# Patient Record
Sex: MALE | URBAN - METROPOLITAN AREA
[De-identification: names, ages, dates, MRNs, and addresses within clinical notes are randomized per-mention and may not be internally consistent; named-entity substitution may affect disease eponyms.]

---

## 2024-01-15 ENCOUNTER — NURSING HOME VISIT (OUTPATIENT)
Dept: POST ACUTE CARE | Facility: EXTERNAL LOCATION | Age: 54
End: 2024-01-15

## 2024-01-15 DIAGNOSIS — K21.9 GASTROESOPHAGEAL REFLUX DISEASE, UNSPECIFIED WHETHER ESOPHAGITIS PRESENT: ICD-10-CM

## 2024-01-15 DIAGNOSIS — K76.82 HEPATIC ENCEPHALOPATHY (MULTI): ICD-10-CM

## 2024-01-15 DIAGNOSIS — R41.840 ATTENTION DEFICIT: Primary | ICD-10-CM

## 2024-01-15 DIAGNOSIS — R53.1 WEAKNESS: Primary | ICD-10-CM

## 2024-01-15 PROCEDURE — 99308 SBSQ NF CARE LOW MDM 20: CPT | Performed by: NURSE PRACTITIONER

## 2024-01-15 RX ORDER — DEXTROAMPHETAMINE SACCHARATE, AMPHETAMINE ASPARTATE, DEXTROAMPHETAMINE SULFATE AND AMPHETAMINE SULFATE 2.5; 2.5; 2.5; 2.5 MG/1; MG/1; MG/1; MG/1
20 TABLET ORAL 2 TIMES DAILY
Qty: 120 TABLET | Refills: 0 | Status: SHIPPED | OUTPATIENT
Start: 2024-01-15 | End: 2024-01-16 | Stop reason: SDUPTHER

## 2024-01-15 NOTE — LETTER
Patient: Raffi Saravia  : 1970    Encounter Date: 01/15/2024    PROGRESS NOTE    Subjective  Chief complaint: Raffi Saravia is a 53 y.o. male who is an acute skilled patient being seen and evaluated for weakness    HPI:  HPI  This patient was admitted to SNF for therapy due to generalized weakness and for medical management after recent hospitalization for hepatic encephalopathy.  Therapy to evaluate and treat. patient seen and examined at bedside in no apparent distress.  Nursing staff voices no new concerns today.  Patient denies chest pain or shortness of breath, nausea or vomiting.  Patient denies nausea vomiting fever chills.     Objective  Vital signs: 118/63, 98.2, 82, 18, 99%    Physical Exam  Constitutional:       General: He is not in acute distress.  Eyes:      Extraocular Movements: Extraocular movements intact.   Cardiovascular:      Rate and Rhythm: Normal rate and regular rhythm.   Pulmonary:      Effort: Pulmonary effort is normal.      Breath sounds: Normal breath sounds.   Abdominal:      General: Bowel sounds are normal.      Palpations: Abdomen is soft.   Musculoskeletal:      Cervical back: Neck supple.      Right lower leg: Edema present.      Left lower leg: Edema present.   Neurological:      Mental Status: He is alert.      Motor: Weakness present.   Psychiatric:         Mood and Affect: Mood normal.         Behavior: Behavior is cooperative.         Assessment/Plan  Problem List Items Addressed This Visit       GERD (gastroesophageal reflux disease)     PPI  Monitor GI symptoms         Hepatic encephalopathy (CMS/HCC)     S/P plug assisted retrograde transvenous obliteration for shunt induced encephalopathy  Liver transplant workup opened, to be completed outpatient  Xifaxan         Weakness - Primary     Therapy to evaluate and treat          Medications, treatments, and labs reviewed  Continue medications and treatments as listed in EMR      Scribe Attestation  Natacha MCCARTHY  Shirley Dupree   attest that this documentation has been prepared under the direction and in the presence of GM Thomas    Provider Attestation - Scribe documentation  All medical record entries made by the Scribe were at my direction and personally dictated by me. I have reviewed the chart and agree that the record accurately reflects my personal performance of the history, physical exam, discussion and plan.   GM Thomas        Electronically Signed By: GM Thomas   1/19/24  5:48 PM

## 2024-01-16 ENCOUNTER — NURSING HOME VISIT (OUTPATIENT)
Dept: POST ACUTE CARE | Facility: EXTERNAL LOCATION | Age: 54
End: 2024-01-16

## 2024-01-16 DIAGNOSIS — R41.840 ATTENTION DEFICIT: ICD-10-CM

## 2024-01-16 DIAGNOSIS — K55.069 SUPERIOR MESENTERIC VEIN THROMBOSIS (MULTI): ICD-10-CM

## 2024-01-16 DIAGNOSIS — K74.60 HEPATIC CIRRHOSIS, UNSPECIFIED HEPATIC CIRRHOSIS TYPE, UNSPECIFIED WHETHER ASCITES PRESENT (MULTI): ICD-10-CM

## 2024-01-16 DIAGNOSIS — K76.82 HEPATIC ENCEPHALOPATHY (MULTI): Primary | ICD-10-CM

## 2024-01-16 DIAGNOSIS — G47.419 NARCOLEPSY WITHOUT CATAPLEXY (HHS-HCC): ICD-10-CM

## 2024-01-16 DIAGNOSIS — K21.9 GASTROESOPHAGEAL REFLUX DISEASE, UNSPECIFIED WHETHER ESOPHAGITIS PRESENT: ICD-10-CM

## 2024-01-16 PROBLEM — K76.9 LIVER DISORDER: Status: ACTIVE | Noted: 2024-01-16

## 2024-01-16 PROBLEM — R53.1 WEAKNESS: Status: ACTIVE | Noted: 2024-01-16

## 2024-01-16 PROCEDURE — 99305 1ST NF CARE MODERATE MDM 35: CPT | Performed by: INTERNAL MEDICINE

## 2024-01-16 RX ORDER — DEXTROAMPHETAMINE SACCHARATE, AMPHETAMINE ASPARTATE, DEXTROAMPHETAMINE SULFATE AND AMPHETAMINE SULFATE 2.5; 2.5; 2.5; 2.5 MG/1; MG/1; MG/1; MG/1
20 TABLET ORAL 2 TIMES DAILY
Qty: 120 TABLET | Refills: 0 | Status: SHIPPED | OUTPATIENT
Start: 2024-01-16

## 2024-01-16 NOTE — PROGRESS NOTES
PROGRESS NOTE    Subjective   Chief complaint: Raffi Saravia is a 53 y.o. male who is an acute skilled patient being seen and evaluated for weakness    HPI:  HPI  This patient was admitted to SNF for therapy due to generalized weakness and for medical management after recent hospitalization for hepatic encephalopathy.  Therapy to evaluate and treat. patient seen and examined at bedside in no apparent distress.  Nursing staff voices no new concerns today.  Patient denies chest pain or shortness of breath, nausea or vomiting.  Patient denies nausea vomiting fever chills.     Objective   Vital signs: 118/63, 98.2, 82, 18, 99%    Physical Exam  Constitutional:       General: He is not in acute distress.  Eyes:      Extraocular Movements: Extraocular movements intact.   Cardiovascular:      Rate and Rhythm: Normal rate and regular rhythm.   Pulmonary:      Effort: Pulmonary effort is normal.      Breath sounds: Normal breath sounds.   Abdominal:      General: Bowel sounds are normal.      Palpations: Abdomen is soft.   Musculoskeletal:      Cervical back: Neck supple.      Right lower leg: Edema present.      Left lower leg: Edema present.   Neurological:      Mental Status: He is alert.      Motor: Weakness present.   Psychiatric:         Mood and Affect: Mood normal.         Behavior: Behavior is cooperative.         Assessment/Plan   Problem List Items Addressed This Visit       GERD (gastroesophageal reflux disease)     PPI  Monitor GI symptoms         Hepatic encephalopathy (CMS/HCC)     S/P plug assisted retrograde transvenous obliteration for shunt induced encephalopathy  Liver transplant workup opened, to be completed outpatient  Xifaxan         Weakness - Primary     Therapy to evaluate and treat          Medications, treatments, and labs reviewed  Continue medications and treatments as listed in EMR      Scribe Attestation  I, Natacha Dupree, Shirley   attest that this documentation has been prepared under  the direction and in the presence of GM Thomas    Provider Attestation - Scribe documentation  All medical record entries made by the Scribe were at my direction and personally dictated by me. I have reviewed the chart and agree that the record accurately reflects my personal performance of the history, physical exam, discussion and plan.   GM Thomas

## 2024-01-16 NOTE — ASSESSMENT & PLAN NOTE
S/P plug assisted retrograde transvenous obliteration for shunt induced encephalopathy  Liver transplant workup opened, to be completed outpatient  Xifaxan

## 2024-01-16 NOTE — LETTER
Patient: Raffi Saravia  : 1970    Encounter Date: 2024    HISTORY & PHYSICAL    Subjective  Chief complaint: Raffi Saravia is a 53 y.o. male who is being seen and evaluated for multiple medical problems.  Patient admitted to SNF for therapy due to weakness after recent hospitalization.    HPI:  HPI  Patient admitted to the hospital after being found lethargic on the floor at home.  Infectious workup was negative for any infection.  Patient continued with confusion and provided medication to help with this.  IR consulted on patient for closure of enlarged splenorenal shunt that was felt to be contributing to worsening confusion.  Patient underwent plug assisted retrograde transvenous obliteration for shunt induced encephalopathy.  Patient did continue on medication for confusion.  Patient was seen by neurology and felt ongoing confusion was related to delirium and hepatic encephalopathy.  Imaging of the brain was negative with no acute changes.  Due to ongoing confusion, liver transplant evaluation was opened and evaluation was conducted and patient with plan to follow-up as an outpatient to complete workup.  CT of abdominal pelvis completed and found to have nonocclusive chronic SMV thrombus with the plan to monitor this with an ultrasound in 3 to 4 weeks.    Past Medical History:   Diagnosis Date   • Cirrhosis of liver (CMS/HCC)    • GERD (gastroesophageal reflux disease)    • Hepatic encephalopathy (CMS/HCC)    • Narcolepsy without cataplexy    • Weakness        No past surgical history on file.    Family History   Problem Relation Name Age of Onset   • No Known Problems Mother     • No Known Problems Father         Social History     Socioeconomic History   • Marital status: Not on file     Spouse name: Not on file   • Number of children: Not on file   • Years of education: Not on file   • Highest education level: Not on file   Occupational History   • Not on file   Tobacco Use   • Smoking  status: Not on file   • Smokeless tobacco: Not on file   Substance and Sexual Activity   • Alcohol use: Not on file   • Drug use: Not on file   • Sexual activity: Not on file   Other Topics Concern   • Not on file   Social History Narrative   • Not on file     Social Determinants of Health     Financial Resource Strain: Not on file   Food Insecurity: Not on file   Transportation Needs: Not on file   Physical Activity: Not on file   Stress: Not on file   Social Connections: Not on file   Intimate Partner Violence: Not on file   Housing Stability: Not on file       Vital signs: 118/63, 98.2, 82, 18, 99%    Objective  Physical Exam  Constitutional:       General: He is not in acute distress.  Eyes:      Extraocular Movements: Extraocular movements intact.   Cardiovascular:      Rate and Rhythm: Normal rate and regular rhythm.   Pulmonary:      Effort: Pulmonary effort is normal.      Breath sounds: Normal breath sounds.   Abdominal:      General: Bowel sounds are normal.      Palpations: Abdomen is soft.   Musculoskeletal:      Cervical back: Neck supple.      Right lower leg: No edema.      Left lower leg: No edema.   Neurological:      Mental Status: He is alert.   Psychiatric:         Mood and Affect: Mood normal.         Behavior: Behavior is cooperative.         Assessment/Plan  Problem List Items Addressed This Visit       Narcolepsy without cataplexy     Monitor  Continue anti-narcolepsy medications         Hepatic encephalopathy (CMS/HCC) - Primary     S/P plug assisted retrograde transvenous obliteration for shunt induced encephalopathy  Liver transplant workup opened, to be completed outpatient  Xifaxan         GERD (gastroesophageal reflux disease)     PPI  Monitor GI symptoms         Cirrhosis of liver (CMS/HCC)     Has not had a alcoholic drink since 2/2023         Superior mesenteric vein thrombosis (CMS/HCC)     Found on CT in hospital, following up with ultrasound          Hospital records  reviewed  Medications, treatments, and labs reviewed  Continue medications and treatments as listed in EMR  Discussed with nursing and therapy      Scribe Attestation  I, Shirley Bravo   attest that this documentation has been prepared under the direction and in the presence of Yenny Booker MD    Provider Attestation - Scribe documentation  All medical record entries made by the Scribe were at my direction and personally dictated by me. I have reviewed the chart and agree that the record accurately reflects my personal performance of the history, physical exam, discussion and plan.   Yenny Booker MD      Electronically Signed By: Yenny Booker MD   1/16/24  6:13 PM

## 2024-01-16 NOTE — PROGRESS NOTES
HISTORY & PHYSICAL    Subjective   Chief complaint: Raffi Saravia is a 53 y.o. male who is being seen and evaluated for multiple medical problems.  Patient admitted to SNF for therapy due to weakness after recent hospitalization.    HPI:  HPI  Patient admitted to the hospital after being found lethargic on the floor at home.  Infectious workup was negative for any infection.  Patient continued with confusion and provided medication to help with this.  IR consulted on patient for closure of enlarged splenorenal shunt that was felt to be contributing to worsening confusion.  Patient underwent plug assisted retrograde transvenous obliteration for shunt induced encephalopathy.  Patient did continue on medication for confusion.  Patient was seen by neurology and felt ongoing confusion was related to delirium and hepatic encephalopathy.  Imaging of the brain was negative with no acute changes.  Due to ongoing confusion, liver transplant evaluation was opened and evaluation was conducted and patient with plan to follow-up as an outpatient to complete workup.  CT of abdominal pelvis completed and found to have nonocclusive chronic SMV thrombus with the plan to monitor this with an ultrasound in 3 to 4 weeks.    Past Medical History:   Diagnosis Date    Cirrhosis of liver (CMS/HCC)     GERD (gastroesophageal reflux disease)     Hepatic encephalopathy (CMS/HCC)     Narcolepsy without cataplexy     Weakness        No past surgical history on file.    Family History   Problem Relation Name Age of Onset    No Known Problems Mother      No Known Problems Father         Social History     Socioeconomic History    Marital status: Not on file     Spouse name: Not on file    Number of children: Not on file    Years of education: Not on file    Highest education level: Not on file   Occupational History    Not on file   Tobacco Use    Smoking status: Not on file    Smokeless tobacco: Not on file   Substance and Sexual Activity     Alcohol use: Not on file    Drug use: Not on file    Sexual activity: Not on file   Other Topics Concern    Not on file   Social History Narrative    Not on file     Social Determinants of Health     Financial Resource Strain: Not on file   Food Insecurity: Not on file   Transportation Needs: Not on file   Physical Activity: Not on file   Stress: Not on file   Social Connections: Not on file   Intimate Partner Violence: Not on file   Housing Stability: Not on file       Vital signs: 118/63, 98.2, 82, 18, 99%    Objective   Physical Exam  Constitutional:       General: He is not in acute distress.  Eyes:      Extraocular Movements: Extraocular movements intact.   Cardiovascular:      Rate and Rhythm: Normal rate and regular rhythm.   Pulmonary:      Effort: Pulmonary effort is normal.      Breath sounds: Normal breath sounds.   Abdominal:      General: Bowel sounds are normal.      Palpations: Abdomen is soft.   Musculoskeletal:      Cervical back: Neck supple.      Right lower leg: No edema.      Left lower leg: No edema.   Neurological:      Mental Status: He is alert.   Psychiatric:         Mood and Affect: Mood normal.         Behavior: Behavior is cooperative.         Assessment/Plan   Problem List Items Addressed This Visit       Narcolepsy without cataplexy     Monitor  Continue anti-narcolepsy medications         Hepatic encephalopathy (CMS/HCC) - Primary     S/P plug assisted retrograde transvenous obliteration for shunt induced encephalopathy  Liver transplant workup opened, to be completed outpatient  Xifaxan         GERD (gastroesophageal reflux disease)     PPI  Monitor GI symptoms         Cirrhosis of liver (CMS/HCC)     Has not had a alcoholic drink since 2/2023         Superior mesenteric vein thrombosis (CMS/HCC)     Found on CT in hospital, following up with ultrasound          Hospital records reviewed  Medications, treatments, and labs reviewed  Continue medications and treatments as listed in  EMR  Discussed with nursing and therapy      Scribe Attestation  I, Shirley Bravo   attest that this documentation has been prepared under the direction and in the presence of Yenny Booker MD    Provider Attestation - Scribe documentation  All medical record entries made by the Scribe were at my direction and personally dictated by me. I have reviewed the chart and agree that the record accurately reflects my personal performance of the history, physical exam, discussion and plan.   Yenny Booker MD

## 2024-01-17 ENCOUNTER — NURSING HOME VISIT (OUTPATIENT)
Dept: POST ACUTE CARE | Facility: EXTERNAL LOCATION | Age: 54
End: 2024-01-17

## 2024-01-17 DIAGNOSIS — E87.1 HYPONATREMIA: ICD-10-CM

## 2024-01-17 DIAGNOSIS — R53.1 WEAKNESS: Primary | ICD-10-CM

## 2024-01-17 DIAGNOSIS — D64.9 ANEMIA, UNSPECIFIED TYPE: ICD-10-CM

## 2024-01-17 DIAGNOSIS — I15.9 SECONDARY HYPERTENSION: ICD-10-CM

## 2024-01-17 DIAGNOSIS — K76.82 HEPATIC ENCEPHALOPATHY (MULTI): ICD-10-CM

## 2024-01-17 PROCEDURE — 99309 SBSQ NF CARE MODERATE MDM 30: CPT | Performed by: NURSE PRACTITIONER

## 2024-01-17 NOTE — LETTER
Patient: Raffi Saravia  : 1970    Encounter Date: 2024    PROGRESS NOTE    Subjective  Chief complaint: Raffi Saravia is a 53 y.o. male who is an acute skilled patient being seen and evaluated for weakness    HPI:  HPI  Therapy is working with patient due to generalized weakness.  Patient is working on therapeutic exercise and activities, neuromuscular reeducation, patient education ADLs and self-care.  Nursing called and reported patient had a sodium of 130, hemoglobin of 9.2.  Orders were placed for fluid restriction and obtain BMP and CBC in 1 week.  Patient was seen and examined at bedside, no apparent distress.  Denies chest pain or shortness of breath.  Denies nausea or vomiting.  Afebrile.    Objective  Vital signs: 118/60, 97.9, 66, 18, 97%    Physical Exam  Constitutional:       General: He is not in acute distress.  Eyes:      Extraocular Movements: Extraocular movements intact.   Cardiovascular:      Rate and Rhythm: Normal rate and regular rhythm.   Pulmonary:      Effort: Pulmonary effort is normal.      Breath sounds: Normal breath sounds.   Abdominal:      General: Bowel sounds are normal.      Palpations: Abdomen is soft.   Musculoskeletal:      Cervical back: Neck supple.      Right lower leg: No edema.      Left lower leg: No edema.   Neurological:      Mental Status: He is alert.      Motor: Weakness present.   Psychiatric:         Mood and Affect: Mood normal.         Behavior: Behavior is cooperative.         Assessment/Plan  Problem List Items Addressed This Visit       Anemia     Monitor labs         Hepatic encephalopathy (CMS/HCC)     S/P plug assisted retrograde transvenous obliteration for shunt induced encephalopathy  Liver transplant workup opened, to be completed outpatient  Xifaxan  Lactulose          HTN (hypertension)     BP at goal  Monitor BP         Hyponatremia     Fluid restriction  Monitor labs         Weakness - Primary     Continue working towards goals in  therapy          Medications, treatments, and labs reviewed  Continue medications and treatments as listed in EMR      Scribe Attestation  I, Shirley Bravo   attest that this documentation has been prepared under the direction and in the presence of GM Thomas    Provider Attestation - Scribe documentation  All medical record entries made by the Scribe were at my direction and personally dictated by me. I have reviewed the chart and agree that the record accurately reflects my personal performance of the history, physical exam, discussion and plan.   GM Thomas        Electronically Signed By: GM Thomas   1/28/24  7:26 AM

## 2024-01-18 ENCOUNTER — NURSING HOME VISIT (OUTPATIENT)
Dept: POST ACUTE CARE | Facility: EXTERNAL LOCATION | Age: 54
End: 2024-01-18

## 2024-01-18 DIAGNOSIS — K21.9 GASTROESOPHAGEAL REFLUX DISEASE, UNSPECIFIED WHETHER ESOPHAGITIS PRESENT: ICD-10-CM

## 2024-01-18 DIAGNOSIS — R53.1 WEAKNESS: ICD-10-CM

## 2024-01-18 DIAGNOSIS — I15.9 SECONDARY HYPERTENSION: ICD-10-CM

## 2024-01-18 PROCEDURE — 99308 SBSQ NF CARE LOW MDM 20: CPT | Performed by: NURSE PRACTITIONER

## 2024-01-18 NOTE — LETTER
Patient: Raffi Saravia  : 1970    Encounter Date: 2024    PROGRESS NOTE    Subjective  Chief complaint: Raffi Saravia is a 53 y.o. male who is an acute skilled patient being seen and evaluated for weakness    HPI:  Patient presents for f/u therapy and general medical care.  Patient seen and examined at beside.  Therapy has been working with the patient to improve strength, endurance, ADLs, and transfers d/t generalized weakness.  Patient has no acute concerns today.      Objective  Vital signs: 118\60,66,97%    Physical Exam  Constitutional:       General: He is not in acute distress.  Eyes:      Extraocular Movements: Extraocular movements intact.   Cardiovascular:      Rate and Rhythm: Normal rate and regular rhythm.   Pulmonary:      Effort: Pulmonary effort is normal.      Breath sounds: Normal breath sounds.   Abdominal:      General: There is no distension.      Palpations: Abdomen is soft.      Tenderness: There is no abdominal tenderness.   Musculoskeletal:      Cervical back: Neck supple.      Right lower leg: Edema present.      Left lower leg: Edema present.   Neurological:      Mental Status: He is alert.      Motor: Weakness present.   Psychiatric:         Mood and Affect: Mood normal.         Behavior: Behavior is cooperative.         Assessment/Plan  Problem List Items Addressed This Visit       GERD (gastroesophageal reflux disease)     PPI  Monitor GI symptoms         Weakness     Work in therapy towards goals         HTN (hypertension)     Furosemide   Monitor BP           Medications, treatments, and labs reviewed  Continue medications and treatments as listed in EMR    Scribe Attestation  Fanta MCCARTHY Scribe   attest that this documentation has been prepared under the direction and in the presence of GM Thomas.     Provider Attestation - Scribe documentation  All medical record entries made by the Scribe were at my direction and personally dictated by  me. I have reviewed the chart and agree that the record accurately reflects my personal performance of the history, physical exam, discussion and plan.   GM Thomas      Electronically Signed By: GM Thomas   1/28/24  4:14 PM

## 2024-01-19 ENCOUNTER — NURSING HOME VISIT (OUTPATIENT)
Dept: POST ACUTE CARE | Facility: EXTERNAL LOCATION | Age: 54
End: 2024-01-19

## 2024-01-19 DIAGNOSIS — G47.419 NARCOLEPSY WITHOUT CATAPLEXY (HHS-HCC): ICD-10-CM

## 2024-01-19 DIAGNOSIS — K21.9 GASTROESOPHAGEAL REFLUX DISEASE, UNSPECIFIED WHETHER ESOPHAGITIS PRESENT: ICD-10-CM

## 2024-01-19 DIAGNOSIS — R53.1 WEAKNESS: Primary | ICD-10-CM

## 2024-01-19 PROCEDURE — 99308 SBSQ NF CARE LOW MDM 20: CPT | Performed by: INTERNAL MEDICINE

## 2024-01-19 NOTE — LETTER
Patient: Raffi Saravia  : 1970    Encounter Date: 2024    PROGRESS NOTE    Subjective  Chief complaint: Raffi Saravia is a 53 y.o. male who is an acute skilled patient being seen and evaluated for weakness    HPI:  HPI  Patient is working in therapy due to generalized weakness. Working on strengthening exercises/activities, gait training, transfers and ADLs.  Patient reports he is having oral surgery on Monday, 2024.  Patient has no complaints.  Denies n/v/f/c.  No new concerns reported by staff.     Objective  Vital signs: 129/70, 97.7, 74, 18, 98%    Physical Exam  Constitutional:       General: He is not in acute distress.  Eyes:      Extraocular Movements: Extraocular movements intact.   Cardiovascular:      Rate and Rhythm: Normal rate and regular rhythm.   Pulmonary:      Effort: Pulmonary effort is normal.      Breath sounds: Normal breath sounds.   Abdominal:      General: Bowel sounds are normal.      Palpations: Abdomen is soft.   Musculoskeletal:      Cervical back: Neck supple.      Right lower leg: No edema.      Left lower leg: No edema.   Neurological:      Mental Status: He is alert.      Motor: Weakness present.   Psychiatric:         Mood and Affect: Mood normal.         Behavior: Behavior is cooperative.         Assessment/Plan  Problem List Items Addressed This Visit       Narcolepsy without cataplexy     Monitor  Continue anti-narcolepsy medications         GERD (gastroesophageal reflux disease)     PPI  Monitor GI symptoms         Weakness - Primary     Work in therapy towards goals          Medications, treatments, and labs reviewed  Continue medications and treatments as listed in EMR      Scribe Attestation  I, Shirley Bravo   attest that this documentation has been prepared under the direction and in the presence of Yenny Booker MD    Provider Attestation - Scribe documentation  All medical record entries made by the Scribe were at my direction and  personally dictated by me. I have reviewed the chart and agree that the record accurately reflects my personal performance of the history, physical exam, discussion and plan.   Yenny Booker MD        Electronically Signed By: Yenny Booker MD   1/19/24 10:09 PM

## 2024-01-19 NOTE — PROGRESS NOTES
PROGRESS NOTE    Subjective   Chief complaint: Raffi Saravia is a 53 y.o. male who is an acute skilled patient being seen and evaluated for weakness    HPI:  HPI  Patient is working in therapy due to generalized weakness. Working on strengthening exercises/activities, gait training, transfers and ADLs.  Patient reports he is having oral surgery on Monday, 1/22/2024.  Patient has no complaints.  Denies n/v/f/c.  No new concerns reported by staff.     Objective   Vital signs: 129/70, 97.7, 74, 18, 98%    Physical Exam  Constitutional:       General: He is not in acute distress.  Eyes:      Extraocular Movements: Extraocular movements intact.   Cardiovascular:      Rate and Rhythm: Normal rate and regular rhythm.   Pulmonary:      Effort: Pulmonary effort is normal.      Breath sounds: Normal breath sounds.   Abdominal:      General: Bowel sounds are normal.      Palpations: Abdomen is soft.   Musculoskeletal:      Cervical back: Neck supple.      Right lower leg: No edema.      Left lower leg: No edema.   Neurological:      Mental Status: He is alert.      Motor: Weakness present.   Psychiatric:         Mood and Affect: Mood normal.         Behavior: Behavior is cooperative.         Assessment/Plan   Problem List Items Addressed This Visit       Narcolepsy without cataplexy     Monitor  Continue anti-narcolepsy medications         GERD (gastroesophageal reflux disease)     PPI  Monitor GI symptoms         Weakness - Primary     Work in therapy towards goals          Medications, treatments, and labs reviewed  Continue medications and treatments as listed in EMR      Scribe Attestation  INatacha Scribe   attest that this documentation has been prepared under the direction and in the presence of Yenny Booker MD    Provider Attestation - Scribe documentation  All medical record entries made by the Scribe were at my direction and personally dictated by me. I have reviewed the chart and agree that the  record accurately reflects my personal performance of the history, physical exam, discussion and plan.   Yenny Booker MD

## 2024-01-23 ENCOUNTER — NURSING HOME VISIT (OUTPATIENT)
Dept: POST ACUTE CARE | Facility: EXTERNAL LOCATION | Age: 54
End: 2024-01-23

## 2024-01-23 DIAGNOSIS — I15.9 SECONDARY HYPERTENSION: ICD-10-CM

## 2024-01-23 DIAGNOSIS — R53.1 WEAKNESS: ICD-10-CM
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
## 2024-01-23 PROBLEM — I10 HTN (HYPERTENSION): Status: ACTIVE | Noted: 2024-01-23

## 2024-01-23 PROBLEM — E87.1 HYPONATREMIA: Status: ACTIVE | Noted: 2024-01-23

## 2024-01-23 PROBLEM — D64.9 ANEMIA: Status: ACTIVE | Noted: 2024-01-23

## 2024-01-23 PROCEDURE — 99309 SBSQ NF CARE MODERATE MDM 30: CPT | Performed by: INTERNAL MEDICINE

## 2024-01-23 NOTE — LETTER
Patient: Raffi Saravia  : 1970    Encounter Date: 2024    PROGRESS NOTE    Subjective  Chief complaint: Raffi Saravia is a 53 y.o. male who is an acute skilled patient being seen and evaluated for weakness    HPI:  Patient has been working in therapy to improve strength, endurance, and ADLs.  Patient continues to work toward goals. . Pt able to stand 2-3 minutes with B UE support and fair-/poor+ balance.limited gait perfomed in room with use of fww and CGA. Standing tolerance work x 5 stands with single UE support. Patient is s\p fall with c\o pain to bilateral hips.  No new concerns today.  Denies n/v/f/c pain.        Objective  Vital signs: 118\60,66,97%    Physical Exam  Constitutional:       General: He is not in acute distress.  Eyes:      Extraocular Movements: Extraocular movements intact.   Cardiovascular:      Rate and Rhythm: Normal rate and regular rhythm.   Pulmonary:      Effort: Pulmonary effort is normal.      Breath sounds: Normal breath sounds.   Abdominal:      General: Bowel sounds are normal.      Palpations: Abdomen is soft.   Musculoskeletal:      Cervical back: Neck supple.      Right lower leg: No edema.      Left lower leg: No edema.   Neurological:      Mental Status: He is alert.      Motor: Weakness present.   Psychiatric:         Mood and Affect: Mood normal.         Behavior: Behavior is cooperative.         Assessment/Plan  Problem List Items Addressed This Visit       Weakness     Work in therapy towards goals         Fall     Xray of bilateral hips  BMP weekly          HTN (hypertension)     Start lasix 40mg daily          Medications, treatments, and labs reviewed  Continue medications and treatments as listed in EMR    Scribe Attestation  I, Shirley Bhardwaj   attest that this documentation has been prepared under the direction and in the presence of Yenny Booker MD.     Provider Attestation - Scribe documentation  All medical record entries made by  the Scribe were at my direction and personally dictated by me. I have reviewed the chart and agree that the record accurately reflects my personal performance of the history, physical exam, discussion and plan.   Yenny Booker MD      Electronically Signed By: Yenny Booker MD   1/24/24 12:21 PM

## 2024-01-23 NOTE — PROGRESS NOTES
PROGRESS NOTE    Subjective   Chief complaint: Raffi Saravia is a 53 y.o. male who is an acute skilled patient being seen and evaluated for weakness    HPI:  Patient has been working in therapy to improve strength, endurance, and ADLs.  Patient continues to work toward goals. . Pt able to stand 2-3 minutes with B UE support and fair-/poor+ balance.limited gait perfomed in room with use of fww and CGA. Standing tolerance work x 5 stands with single UE support. Patient is s\p fall with c\o pain to bilateral hips.  No new concerns today.  Denies n/v/f/c pain.        Objective   Vital signs: 118\60,66,97%    Physical Exam  Constitutional:       General: He is not in acute distress.  Eyes:      Extraocular Movements: Extraocular movements intact.   Cardiovascular:      Rate and Rhythm: Normal rate and regular rhythm.   Pulmonary:      Effort: Pulmonary effort is normal.      Breath sounds: Normal breath sounds.   Abdominal:      General: Bowel sounds are normal.      Palpations: Abdomen is soft.   Musculoskeletal:      Cervical back: Neck supple.      Right lower leg: No edema.      Left lower leg: No edema.   Neurological:      Mental Status: He is alert.      Motor: Weakness present.   Psychiatric:         Mood and Affect: Mood normal.         Behavior: Behavior is cooperative.         Assessment/Plan   Problem List Items Addressed This Visit       Weakness     Work in therapy towards goals         Fall     Xray of bilateral hips  BMP weekly          HTN (hypertension)     Start lasix 40mg daily          Medications, treatments, and labs reviewed  Continue medications and treatments as listed in EMR    Scribe Attestation  Fanta MCCARTHY Scribe   attest that this documentation has been prepared under the direction and in the presence of Yenny Booker MD.     Provider Attestation - Scribe documentation  All medical record entries made by the Scribe were at my direction and personally dictated by me. I have  reviewed the chart and agree that the record accurately reflects my personal performance of the history, physical exam, discussion and plan.   Yenny Booker MD

## 2024-01-23 NOTE — PROGRESS NOTES
PROGRESS NOTE    Subjective   Chief complaint: Raffi Saravia is a 53 y.o. male who is an acute skilled patient being seen and evaluated for weakness    HPI:  HPI  Therapy is working with patient due to generalized weakness.  Patient is working on therapeutic exercise and activities, neuromuscular reeducation, patient education ADLs and self-care.  Nursing called and reported patient had a sodium of 130, hemoglobin of 9.2.  Orders were placed for fluid restriction and obtain BMP and CBC in 1 week.  Patient was seen and examined at bedside, no apparent distress.  Denies chest pain or shortness of breath.  Denies nausea or vomiting.  Afebrile.    Objective   Vital signs: 118/60, 97.9, 66, 18, 97%    Physical Exam  Constitutional:       General: He is not in acute distress.  Eyes:      Extraocular Movements: Extraocular movements intact.   Cardiovascular:      Rate and Rhythm: Normal rate and regular rhythm.   Pulmonary:      Effort: Pulmonary effort is normal.      Breath sounds: Normal breath sounds.   Abdominal:      General: Bowel sounds are normal.      Palpations: Abdomen is soft.   Musculoskeletal:      Cervical back: Neck supple.      Right lower leg: Edema present.      Left lower leg: Edema present.   Neurological:      Mental Status: He is alert.      Motor: Weakness present.   Psychiatric:         Mood and Affect: Mood normal.         Behavior: Behavior is cooperative.         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  Liver transplant workup opened, to be completed outpatient  Xifaxan  Lactulose          HTN (hypertension)     BP at goal  Monitor BP         Hyponatremia     Fluid restriction  Monitor labs         Weakness - Primary     Continue working towards goals in therapy          Medications, treatments, and labs  reviewed  Continue medications and treatments as listed in EMR      Scribe Attestation  INatacha Scribe   attest that this documentation has been prepared under the direction and in the presence of GM Thomas    Provider Attestation - Scribe documentation  All medical record entries made by the Scribe were at my direction and personally dictated by me. I have reviewed the chart and agree that the record accurately reflects my personal performance of the history, physical exam, discussion and plan.   GM Thomas

## 2024-01-24 ENCOUNTER — NURSING HOME VISIT (OUTPATIENT)
Dept: POST ACUTE CARE | Facility: EXTERNAL LOCATION | Age: 54
End: 2024-01-24

## 2024-01-24 DIAGNOSIS — K74.60 HEPATIC CIRRHOSIS, UNSPECIFIED HEPATIC CIRRHOSIS TYPE, UNSPECIFIED WHETHER ASCITES PRESENT (MULTI): ICD-10-CM

## 2024-01-24 DIAGNOSIS — I15.9 SECONDARY HYPERTENSION: ICD-10-CM

## 2024-01-24 DIAGNOSIS — R53.1 WEAKNESS: Primary | ICD-10-CM

## 2024-01-24 DIAGNOSIS — R60.9 EDEMA, UNSPECIFIED TYPE: ICD-10-CM

## 2024-01-24 PROCEDURE — 99308 SBSQ NF CARE LOW MDM 20: CPT | Performed by: NURSE PRACTITIONER

## 2024-01-24 NOTE — PROGRESS NOTES
PROGRESS NOTE    Subjective   Chief complaint: Raffi Saravia is a 53 y.o. male who is an acute skilled patient being seen and evaluated for weakness    HPI:  Patient presents for f/u therapy and general medical care.  Patient seen and examined at beside.  Therapy has been working with the patient to improve strength, endurance, ADLs, and transfers d/t generalized weakness.  Patient has no acute concerns today.      Objective   Vital signs: 118\60,66,97%    Physical Exam  Constitutional:       General: He is not in acute distress.  Eyes:      Extraocular Movements: Extraocular movements intact.   Cardiovascular:      Rate and Rhythm: Normal rate and regular rhythm.   Pulmonary:      Effort: Pulmonary effort is normal.      Breath sounds: Normal breath sounds.   Abdominal:      General: There is no distension.      Palpations: Abdomen is soft.      Tenderness: There is no abdominal tenderness.   Musculoskeletal:      Cervical back: Neck supple.      Right lower leg: Edema present.      Left lower leg: Edema present.   Neurological:      Mental Status: He is alert.      Motor: Weakness present.   Psychiatric:         Mood and Affect: Mood normal.         Behavior: Behavior is cooperative.         Assessment/Plan   Problem List Items Addressed This Visit       GERD (gastroesophageal reflux disease)     PPI  Monitor GI symptoms         Weakness     Work in therapy towards goals         HTN (hypertension)     Furosemide   Monitor BP           Medications, treatments, and labs reviewed  Continue medications and treatments as listed in EMR    Scribe Attestation  I, Shirley Bhardwaj   attest that this documentation has been prepared under the direction and in the presence of GM Thomas.     Provider Attestation - Scribe documentation  All medical record entries made by the Scribe were at my direction and personally dictated by me. I have reviewed the chart and agree that the record accurately  reflects my personal performance of the history, physical exam, discussion and plan.   Claire Barrera, APRN-CNP

## 2024-01-24 NOTE — LETTER
Patient: Raffi Saravia  : 1970    Encounter Date: 2024    PROGRESS NOTE    Subjective  Chief complaint: Raffi Saarvia is a 53 y.o. male who is an acute skilled patient being seen and evaluated for weakness    HPI:  HPI  Patient is working in therapy due to generalized weakness.  Patient is able to stand up for 2 minutes with single-point to both upper extremities for support with fair balance.  Therapy is also working with patient on transfers, able to complete with min assist.  Patient seen in follow-up to edema, was started on Lasix on .  Patient had complaints of leg pain associated.  Patient is trying to elevate legs as able.  Patient seen and examined at bedside, no apparent distress.  Denies chest pain or shortness of breath.    Objective  Vital signs: 110/56, 98.0, 65, 20, 95%    Physical Exam  Constitutional:       General: He is not in acute distress.  Eyes:      Extraocular Movements: Extraocular movements intact.   Cardiovascular:      Rate and Rhythm: Normal rate and regular rhythm.   Pulmonary:      Effort: Pulmonary effort is normal.      Breath sounds: Normal breath sounds.   Abdominal:      General: Bowel sounds are normal.      Palpations: Abdomen is soft.   Musculoskeletal:      Cervical back: Neck supple.      Right lower leg: Edema present.      Left lower leg: Edema present.   Neurological:      Mental Status: He is alert.      Motor: Weakness present.   Psychiatric:         Mood and Affect: Mood normal.         Behavior: Behavior is cooperative.         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  Liver transplant workup opened, to be completed outpatient  Xifaxan  Lactulose          Edema     Lasix, elevation, compression         HTN (hypertension)     BP at  goal  Monitor BP         Weakness - Primary     Continue to work in therapy towards goals          Medications, treatments, and labs reviewed  Continue medications and treatments as listed in EMR      Scribe Attestation  INatacha Scribe   attest that this documentation has been prepared under the direction and in the presence of GM Thomas    Provider Attestation - Scribe documentation  All medical record entries made by the Scribe were at my direction and personally dictated by me. I have reviewed the chart and agree that the record accurately reflects my personal performance of the history, physical exam, discussion and plan.   GM Thomas        Electronically Signed By: GM Thomas   2/2/24 12:07 PM

## 2024-01-25 ENCOUNTER — NURSING HOME VISIT (OUTPATIENT)
Dept: POST ACUTE CARE | Facility: EXTERNAL LOCATION | Age: 54
End: 2024-01-25

## 2024-01-25 DIAGNOSIS — K74.60 HEPATIC CIRRHOSIS, UNSPECIFIED HEPATIC CIRRHOSIS TYPE, UNSPECIFIED WHETHER ASCITES PRESENT (MULTI): Primary | ICD-10-CM

## 2024-01-25 DIAGNOSIS — I15.9 SECONDARY HYPERTENSION: ICD-10-CM

## 2024-01-25 DIAGNOSIS — R53.1 WEAKNESS: ICD-10-CM

## 2024-01-25 DIAGNOSIS — R60.9 EDEMA, UNSPECIFIED TYPE: ICD-10-CM

## 2024-01-25 PROCEDURE — 99308 SBSQ NF CARE LOW MDM 20: CPT | Performed by: NURSE PRACTITIONER

## 2024-01-25 NOTE — LETTER
Patient: Raffi Saravia  : 1970    Encounter Date: 2024    PROGRESS NOTE    Subjective  Chief complaint: Raffi Saravia is a 53 y.o. male who is an acute skilled patient being seen and evaluated for weakness    HPI:  Patient has been working in therapy to improve strength, endurance, and ADLs.  Patient continues to work toward goals. Following up on edema. Lasix was started  with BMP weekly. Patient is requesting to go to [facility] d\t edema.  Denies n/v/f/c pain sob.        Objective  Vital signs: 122\60,67,98%    Physical Exam  Constitutional:       General: He is not in acute distress.  Eyes:      Extraocular Movements: Extraocular movements intact.   Cardiovascular:      Rate and Rhythm: Normal rate and regular rhythm.   Pulmonary:      Effort: Pulmonary effort is normal.      Breath sounds: Normal breath sounds.   Abdominal:      General: Bowel sounds are normal.      Palpations: Abdomen is soft.   Musculoskeletal:      Cervical back: Neck supple.      Right lower leg: Edema present.      Left lower leg: Edema present.   Neurological:      Mental Status: He is alert.      Motor: Weakness present.   Psychiatric:         Mood and Affect: Mood normal.         Behavior: Behavior is cooperative.         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     Provider Attestation - Scribe documentation  All medical record entries made by the Scribe were at my direction and personally dictated by me. I have reviewed the chart and agree that the record accurately reflects my personal performance of the history, physical exam, discussion and plan.   GM Thomas      Electronically Signed By: GM Thomas   2/17/24  6:07 PM

## 2024-01-28 NOTE — ASSESSMENT & PLAN NOTE
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

## 2024-01-29 PROBLEM — R60.9 EDEMA: Status: ACTIVE | Noted: 2024-01-29

## 2024-01-29 NOTE — PROGRESS NOTES
PROGRESS NOTE    Subjective   Chief complaint: Raffi Saravia is a 53 y.o. male who is an acute skilled patient being seen and evaluated for weakness    HPI:  HPI  Patient is working in therapy due to generalized weakness.  Patient is able to stand up for 2 minutes with single-point to both upper extremities for support with fair balance.  Therapy is also working with patient on transfers, able to complete with min assist.  Patient seen in follow-up to edema, was started on Lasix on 1/23.  Patient had complaints of leg pain associated.  Patient is trying to elevate legs as able.  Patient seen and examined at bedside, no apparent distress.  Denies chest pain or shortness of breath.    Objective   Vital signs: 110/56, 98.0, 65, 20, 95%    Physical Exam  Constitutional:       General: He is not in acute distress.  Eyes:      Extraocular Movements: Extraocular movements intact.   Cardiovascular:      Rate and Rhythm: Normal rate and regular rhythm.   Pulmonary:      Effort: Pulmonary effort is normal.      Breath sounds: Normal breath sounds.   Abdominal:      General: Bowel sounds are normal.      Palpations: Abdomen is soft.   Musculoskeletal:      Cervical back: Neck supple.      Right lower leg: Edema present.      Left lower leg: Edema present.   Neurological:      Mental Status: He is alert.      Motor: Weakness present.   Psychiatric:         Mood and Affect: Mood normal.         Behavior: Behavior is cooperative.         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  Liver transplant workup opened, to be completed outpatient  Xifaxan  Lactulose          Edema     Lasix, elevation, compression         HTN (hypertension)     BP at goal  Monitor BP         Weakness - Primary     Continue to work in therapy  towards goals          Medications, treatments, and labs reviewed  Continue medications and treatments as listed in EMR      Scribe Attestation  I, Shirley Bravo   attest that this documentation has been prepared under the direction and in the presence of GM Thomas    Provider Attestation - Scribe documentation  All medical record entries made by the Scribe were at my direction and personally dictated by me. I have reviewed the chart and agree that the record accurately reflects my personal performance of the history, physical exam, discussion and plan.   GM Thomas

## 2024-02-01 NOTE — PROGRESS NOTES
PROGRESS NOTE    Subjective   Chief complaint: Raffi Saravia is a 53 y.o. male who is an acute skilled patient being seen and evaluated for weakness    HPI:  Patient has been working in therapy to improve strength, endurance, and ADLs.  Patient continues to work toward goals. Following up on edema. Lasix was started 1\23 with BMP weekly. Patient is requesting to go to [facility] [place] d\t edema.  Denies n/v/f/c pain sob.        Objective   Vital signs: 122\60,67,98%    Physical Exam  Constitutional:       General: He is not in acute distress.  Eyes:      Extraocular Movements: Extraocular movements intact.   Cardiovascular:      Rate and Rhythm: Normal rate and regular rhythm.   Pulmonary:      Effort: Pulmonary effort is normal.      Breath sounds: Normal breath sounds.   Abdominal:      General: Bowel sounds are normal.      Palpations: Abdomen is soft.   Musculoskeletal:      Cervical back: Neck supple.      Right lower leg: Edema present.      Left lower leg: Edema present.   Neurological:      Mental Status: He is alert.      Motor: Weakness present.   Psychiatric:         Mood and Affect: Mood normal.         Behavior: Behavior is cooperative.         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     Provider Attestation - Scribe documentation  All medical record entries made by the Scribe were at my direction and personally dictated by me. I have reviewed the chart and agree that the record accurately reflects my personal performance of the history, physical exam, discussion and plan.   Claire Barrera, STACEY-CNP

## 2024-02-02 NOTE — ASSESSMENT & PLAN NOTE
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